# Patient Record
Sex: FEMALE | Race: WHITE | NOT HISPANIC OR LATINO | Employment: STUDENT | URBAN - METROPOLITAN AREA
[De-identification: names, ages, dates, MRNs, and addresses within clinical notes are randomized per-mention and may not be internally consistent; named-entity substitution may affect disease eponyms.]

---

## 2020-01-06 ENCOUNTER — HOSPITAL ENCOUNTER (EMERGENCY)
Facility: HOSPITAL | Age: 30
Discharge: HOME | End: 2020-01-06
Attending: FAMILY MEDICINE
Payer: COMMERCIAL

## 2020-01-06 VITALS
HEIGHT: 66 IN | HEART RATE: 75 BPM | WEIGHT: 293 LBS | SYSTOLIC BLOOD PRESSURE: 121 MMHG | BODY MASS INDEX: 47.09 KG/M2 | RESPIRATION RATE: 17 BRPM | TEMPERATURE: 99 F | OXYGEN SATURATION: 97 % | DIASTOLIC BLOOD PRESSURE: 56 MMHG

## 2020-01-06 DIAGNOSIS — A08.4 VIRAL GASTROENTERITIS: Primary | ICD-10-CM

## 2020-01-06 LAB
ALBUMIN SERPL BCP-MCNC: 3.8 G/DL (ref 3.5–5.2)
ALP SERPL-CCNC: 61 U/L (ref 55–135)
ALT SERPL W/O P-5'-P-CCNC: 79 U/L (ref 10–44)
ANION GAP SERPL CALC-SCNC: 14 MMOL/L (ref 8–16)
AST SERPL-CCNC: 53 U/L (ref 10–40)
B-HCG UR QL: NEGATIVE
BASOPHILS # BLD AUTO: 0.03 K/UL (ref 0–0.2)
BASOPHILS NFR BLD: 0.4 % (ref 0–1.9)
BILIRUB SERPL-MCNC: 0.5 MG/DL (ref 0.1–1)
BILIRUB UR QL STRIP: NEGATIVE
BUN SERPL-MCNC: 7 MG/DL (ref 6–20)
CALCIUM SERPL-MCNC: 9.4 MG/DL (ref 8.7–10.5)
CHLORIDE SERPL-SCNC: 102 MMOL/L (ref 95–110)
CLARITY UR: CLEAR
CO2 SERPL-SCNC: 24 MMOL/L (ref 23–29)
COLOR UR: YELLOW
CREAT SERPL-MCNC: 0.8 MG/DL (ref 0.5–1.4)
DIFFERENTIAL METHOD: ABNORMAL
EOSINOPHIL # BLD AUTO: 0.1 K/UL (ref 0–0.5)
EOSINOPHIL NFR BLD: 1.3 % (ref 0–8)
ERYTHROCYTE [DISTWIDTH] IN BLOOD BY AUTOMATED COUNT: 12.9 % (ref 11.5–14.5)
EST. GFR  (AFRICAN AMERICAN): >60 ML/MIN/1.73 M^2
EST. GFR  (NON AFRICAN AMERICAN): >60 ML/MIN/1.73 M^2
GLUCOSE SERPL-MCNC: 110 MG/DL (ref 70–110)
GLUCOSE UR QL STRIP: NEGATIVE
HCT VFR BLD AUTO: 41.7 % (ref 37–48.5)
HGB BLD-MCNC: 13.6 G/DL (ref 12–16)
HGB UR QL STRIP: ABNORMAL
IMM GRANULOCYTES # BLD AUTO: 0.04 K/UL (ref 0–0.04)
IMM GRANULOCYTES NFR BLD AUTO: 0.5 % (ref 0–0.5)
INFLUENZA A, MOLECULAR: NEGATIVE
INFLUENZA B, MOLECULAR: NEGATIVE
KETONES UR QL STRIP: NEGATIVE
LEUKOCYTE ESTERASE UR QL STRIP: NEGATIVE
LIPASE SERPL-CCNC: <4 U/L (ref 4–60)
LYMPHOCYTES # BLD AUTO: 2.1 K/UL (ref 1–4.8)
LYMPHOCYTES NFR BLD: 26 % (ref 18–48)
MCH RBC QN AUTO: 27.8 PG (ref 27–31)
MCHC RBC AUTO-ENTMCNC: 32.6 G/DL (ref 32–36)
MCV RBC AUTO: 85 FL (ref 82–98)
MONOCYTES # BLD AUTO: 0.4 K/UL (ref 0.3–1)
MONOCYTES NFR BLD: 4.3 % (ref 4–15)
NEUTROPHILS # BLD AUTO: 5.6 K/UL (ref 1.8–7.7)
NEUTROPHILS NFR BLD: 67.5 % (ref 38–73)
NITRITE UR QL STRIP: NEGATIVE
NRBC BLD-RTO: 0 /100 WBC
PH UR STRIP: 7 [PH] (ref 5–8)
PLATELET # BLD AUTO: 420 K/UL (ref 150–350)
PMV BLD AUTO: 9.1 FL (ref 9.2–12.9)
POTASSIUM SERPL-SCNC: 4.4 MMOL/L (ref 3.5–5.1)
PROT SERPL-MCNC: 7.5 G/DL (ref 6–8.4)
PROT UR QL STRIP: NEGATIVE
RBC # BLD AUTO: 4.89 M/UL (ref 4–5.4)
SODIUM SERPL-SCNC: 140 MMOL/L (ref 136–145)
SP GR UR STRIP: <=1.005 (ref 1–1.03)
SPECIMEN SOURCE: NORMAL
URN SPEC COLLECT METH UR: ABNORMAL
UROBILINOGEN UR STRIP-ACNC: NEGATIVE EU/DL
WBC # BLD AUTO: 8.23 K/UL (ref 3.9–12.7)

## 2020-01-06 PROCEDURE — 63600175 PHARM REV CODE 636 W HCPCS: Performed by: FAMILY MEDICINE

## 2020-01-06 PROCEDURE — 85025 COMPLETE CBC W/AUTO DIFF WBC: CPT

## 2020-01-06 PROCEDURE — 80053 COMPREHEN METABOLIC PANEL: CPT

## 2020-01-06 PROCEDURE — 81025 URINE PREGNANCY TEST: CPT

## 2020-01-06 PROCEDURE — 87502 INFLUENZA DNA AMP PROBE: CPT

## 2020-01-06 PROCEDURE — 81003 URINALYSIS AUTO W/O SCOPE: CPT

## 2020-01-06 PROCEDURE — 99284 EMERGENCY DEPT VISIT MOD MDM: CPT | Mod: 25

## 2020-01-06 PROCEDURE — 96374 THER/PROPH/DIAG INJ IV PUSH: CPT

## 2020-01-06 PROCEDURE — 83690 ASSAY OF LIPASE: CPT

## 2020-01-06 PROCEDURE — 36415 COLL VENOUS BLD VENIPUNCTURE: CPT

## 2020-01-06 RX ORDER — ONDANSETRON 2 MG/ML
4 INJECTION INTRAMUSCULAR; INTRAVENOUS
Status: COMPLETED | OUTPATIENT
Start: 2020-01-06 | End: 2020-01-06

## 2020-01-06 RX ORDER — ONDANSETRON 2 MG/ML
4 INJECTION INTRAMUSCULAR; INTRAVENOUS
Status: DISCONTINUED | OUTPATIENT
Start: 2020-01-06 | End: 2020-01-06

## 2020-01-06 RX ORDER — DULOXETIN HYDROCHLORIDE 20 MG/1
90 CAPSULE, DELAYED RELEASE ORAL DAILY
COMMUNITY

## 2020-01-06 RX ORDER — BUSPIRONE HYDROCHLORIDE 10 MG/1
10 TABLET ORAL 2 TIMES DAILY
COMMUNITY

## 2020-01-06 RX ORDER — PROMETHAZINE HYDROCHLORIDE 25 MG/1
12.5 TABLET ORAL EVERY 6 HOURS PRN
Qty: 15 TABLET | Refills: 0 | Status: SHIPPED | OUTPATIENT
Start: 2020-01-06

## 2020-01-06 RX ORDER — ZOLPIDEM TARTRATE 10 MG/1
5 TABLET ORAL NIGHTLY PRN
COMMUNITY

## 2020-01-06 RX ADMIN — ONDANSETRON 4 MG: 2 INJECTION INTRAMUSCULAR; INTRAVENOUS at 01:01

## 2020-01-06 RX ADMIN — SODIUM CHLORIDE 1000 ML: 0.9 INJECTION, SOLUTION INTRAVENOUS at 01:01

## 2020-01-06 NOTE — ED PROVIDER NOTES
SCRIBE #1 NOTE: I, Cali Guillen, am scribing for, and in the presence of, Moraima Presley MD. I have scribed the entire note.       History     Chief Complaint   Patient presents with    Vomiting     +N/V/D pt was seen at urgent care. She was give 2 L LR and zofran. She is still vomiting and unable to keep anything down. LMP 2 mos ago     Review of patient's allergies indicates:   Allergen Reactions    Dextromethorphan Other (See Comments)         History of Present Illness     HPI    1/6/2020, 1:49 AM  History obtained from the patient      History of Present Illness: Carolina Wolf is a 29 y.o. female patient with a PMHx of anxiety and depression who presents to the Emergency Department for evaluation of vomiting which onset suddenly 3 days PTA. Pt's LNMP was 2.5 months ago. Pt states she has irregular menses; pt does not take BC and has not been sexually active for over a year. Pt went to a walk-in clinic 2 days PTA, where she was given 2L lactated ringer's and an Rx for sublingual Zofran. Pt states the Zofran helped until yesterday. Symptoms are episodic and moderate in severity. No mitigating or exacerbating factors reported. Associated sxs include nausea, diarrhea, and reduced appetite. Patient denies any abdominal pain, fever, chills, constipation, hematochezia, dysuria, hematuria, urinary frequency, vaginal discharge, and all other sxs at this time. Prior Tx includes 2L lactated ringer's and Zofran with some sx improvement. No further complaints or concerns at this time.       Arrival mode: Personal vehicle    PCP: Primary Doctor No        Past Medical History:  Past Medical History:   Diagnosis Date    Anxiety     Depressed      Past Surgical History:  History reviewed. No pertinent surgical history.    Family History:  History reviewed. No pertinent family history.    Social History:  Social History Main Topics    Smoking status: Unknown if ever smoked    Smokeless tobacco: Unknown if ever used     Alcohol Use: Unknown drinking history    Drug Use: Unknown if ever used    Sexual Activity: Unknown        Review of Systems     Review of Systems   Constitutional: Positive for appetite change (Reduced). Negative for chills and fever.   HENT: Negative for sore throat.    Respiratory: Negative for cough and shortness of breath.    Cardiovascular: Negative for chest pain and leg swelling.   Gastrointestinal: Positive for diarrhea, nausea and vomiting. Negative for abdominal pain, blood in stool and constipation.   Genitourinary: Negative for dysuria, frequency, hematuria and vaginal discharge.   Musculoskeletal: Negative for back pain, neck pain and neck stiffness.   Skin: Negative for rash and wound.   Neurological: Negative for dizziness, weakness, light-headedness, numbness and headaches.   Hematological: Does not bruise/bleed easily.   All other systems reviewed and are negative.     Physical Exam     Initial Vitals [01/06/20 0052]   BP Pulse Resp Temp SpO2   134/82 93 17 100.3 °F (37.9 °C) 98 %      MAP       --          Physical Exam  Nursing Notes and Vital Signs Reviewed.  Constitutional: Patient is in no acute distress. Morbidly obese.  Head: Atraumatic. Normocephalic.  Eyes: PERRL. EOM intact. Conjunctivae are not pale. No scleral icterus.  ENT: Mucous membranes are moist. Oropharynx is clear and symmetric.    Neck: Supple. Full ROM. No lymphadenopathy.  Cardiovascular: Regular rate. Regular rhythm. No murmurs, rubs, or gallops. Distal pulses are 2+ and symmetric.  Pulmonary/Chest: No respiratory distress. Clear to auscultation bilaterally. No wheezing or rales.  Abdominal: Soft and non-distended.  There is no tenderness.  No rebound, guarding, or rigidity. Good bowel sounds.  Genitourinary: No CVA tenderness  Musculoskeletal: Moves all extremities. No obvious deformities. No edema. No calf tenderness.  Skin: Warm and dry.  Neurological:  Alert, awake, and appropriate.  Normal speech.  No acute focal  "neurological deficits are appreciated.  Psychiatric: Normal affect. Good eye contact. Appropriate in content.     ED Course   Procedures  ED Vital Signs:  Vitals:    01/06/20 0052 01/06/20 0300   BP: 134/82 (!) 121/56   Pulse: 93 75   Resp: 17    Temp: 100.3 °F (37.9 °C) 98.6 °F (37 °C)   TempSrc: Oral    SpO2: 98% 97%   Weight: (!) 149.3 kg (329 lb 4.1 oz)    Height: 5' 6" (1.676 m)        Abnormal Lab Results:  Labs Reviewed   CBC W/ AUTO DIFFERENTIAL - Abnormal; Notable for the following components:       Result Value    Platelets 420 (*)     MPV 9.1 (*)     All other components within normal limits    Narrative:     For upper or mid abdominal pain.   COMPREHENSIVE METABOLIC PANEL - Abnormal; Notable for the following components:    AST 53 (*)     ALT 79 (*)     All other components within normal limits    Narrative:     For upper or mid abdominal pain.   URINALYSIS, REFLEX TO URINE CULTURE - Abnormal; Notable for the following components:    Specific Gravity, UA <=1.005 (*)     Occult Blood UA Trace (*)     All other components within normal limits    Narrative:     In and Out Cath as needed it patient unable to void  Preferred Collection Type->Urine, Clean Catch   INFLUENZA A & B BY MOLECULAR   LIPASE    Narrative:     For upper or mid abdominal pain.   PREGNANCY TEST, URINE RAPID        All Lab Results:  Results for orders placed or performed during the hospital encounter of 01/06/20   Influenza A & B by Molecular   Result Value Ref Range    Influenza A, Molecular Negative Negative    Influenza B, Molecular Negative Negative    Flu A & B Source Nasal swab    CBC auto differential   Result Value Ref Range    WBC 8.23 3.90 - 12.70 K/uL    RBC 4.89 4.00 - 5.40 M/uL    Hemoglobin 13.6 12.0 - 16.0 g/dL    Hematocrit 41.7 37.0 - 48.5 %    Mean Corpuscular Volume 85 82 - 98 fL    Mean Corpuscular Hemoglobin 27.8 27.0 - 31.0 pg    Mean Corpuscular Hemoglobin Conc 32.6 32.0 - 36.0 g/dL    RDW 12.9 11.5 - 14.5 %    " Platelets 420 (H) 150 - 350 K/uL    MPV 9.1 (L) 9.2 - 12.9 fL    Immature Granulocytes 0.5 0.0 - 0.5 %    Gran # (ANC) 5.6 1.8 - 7.7 K/uL    Immature Grans (Abs) 0.04 0.00 - 0.04 K/uL    Lymph # 2.1 1.0 - 4.8 K/uL    Mono # 0.4 0.3 - 1.0 K/uL    Eos # 0.1 0.0 - 0.5 K/uL    Baso # 0.03 0.00 - 0.20 K/uL    nRBC 0 0 /100 WBC    Gran% 67.5 38.0 - 73.0 %    Lymph% 26.0 18.0 - 48.0 %    Mono% 4.3 4.0 - 15.0 %    Eosinophil% 1.3 0.0 - 8.0 %    Basophil% 0.4 0.0 - 1.9 %    Differential Method Automated    Comprehensive metabolic panel   Result Value Ref Range    Sodium 140 136 - 145 mmol/L    Potassium 4.4 3.5 - 5.1 mmol/L    Chloride 102 95 - 110 mmol/L    CO2 24 23 - 29 mmol/L    Glucose 110 70 - 110 mg/dL    BUN, Bld 7 6 - 20 mg/dL    Creatinine 0.8 0.5 - 1.4 mg/dL    Calcium 9.4 8.7 - 10.5 mg/dL    Total Protein 7.5 6.0 - 8.4 g/dL    Albumin 3.8 3.5 - 5.2 g/dL    Total Bilirubin 0.5 0.1 - 1.0 mg/dL    Alkaline Phosphatase 61 55 - 135 U/L    AST 53 (H) 10 - 40 U/L    ALT 79 (H) 10 - 44 U/L    Anion Gap 14 8 - 16 mmol/L    eGFR if African American >60 >60 mL/min/1.73 m^2    eGFR if non African American >60 >60 mL/min/1.73 m^2   Urinalysis, Reflex to Urine Culture Urine, Clean Catch   Result Value Ref Range    Specimen UA Urine, Clean Catch     Color, UA Yellow Yellow, Straw, [place]    Appearance, UA Clear Clear    pH, UA 7.0 5.0 - 8.0    Specific Gravity, UA <=1.005 (A) 1.005 - 1.030    Protein, UA Negative Negative    Glucose, UA Negative Negative    Ketones, UA Negative Negative    Bilirubin (UA) Negative Negative    Occult Blood UA Trace (A) Negative    Nitrite, UA Negative Negative    Urobilinogen, UA Negative <2.0 EU/dL    Leukocytes, UA Negative Negative   Lipase   Result Value Ref Range    Lipase <4 4 - 60 U/L   Pregnancy, urine rapid (UPT)   Result Value Ref Range    Preg Test, Ur Negative         Imaging Results:  Imaging Results    None              The Emergency Provider reviewed the vital signs and test  results, which are outlined above.     ED Discussion     2:56 AM: Reassessed pt at this time.  Pt states her condition has improved at this time. Discussed with pt all pertinent ED information and results. Discussed pt dx and plan of tx. Gave pt all f/u and return to the ED instructions. All questions and concerns were addressed at this time. Pt expresses understanding of information and instructions, and is comfortable with plan to discharge. Pt is stable for discharge.    I discussed with patient and/or family/caretaker that evaluation in the ED does not suggest any emergent or life threatening medical conditions requiring immediate intervention beyond what was provided in the ED, and I believe patient is safe for discharge.  Regardless, an unremarkable evaluation in the ED does not preclude the development or presence of a serious of life threatening condition. As such, patient was instructed to return immediately for any worsening or change in current symptoms.      Discussed the signs and symptoms of gastroenteritis with patient including: abdominal pain; nausea, vomiting, and diarrhea; chills; clammy skin; excessive sweating; fever; joint stiffness; fecal incontinence; muscle pain; and weight loss. Advised patient to drink water or sports beverages such as Gatorade for electrolyte replacement; do NOT use fruit juice (including apple juice), sodas or cola (flat or bubbly), Jell-O, or broth due to high sugar content; drink small amounts of fluid (2-4 oz.) every 30-60 minutes; and eat small amounts of food, when tolerable such as cereals, bread, potatoes, apples, bananas, and vegetables.  I also instructed on disease transmission and need for frequent hand washing.    Medical Decision Making:   Clinical Tests:   Lab Tests: Ordered and Reviewed           ED Medication(s):  Medications   ondansetron injection 4 mg (4 mg Intravenous Given 1/6/20 0106)   sodium chloride 0.9% bolus 1,000 mL (1,000 mLs Intravenous New  "Bag 1/6/20 0103)       Discharge Medication List as of 1/6/2020  2:54 AM      START taking these medications    Details   promethazine (PHENERGAN) 25 MG tablet Take 0.5 tablets (12.5 mg total) by mouth every 6 (six) hours as needed for Nausea., Starting Mon 1/6/2020, Print             Follow-up Information     [facility]. Schedule an appointment as soon as possible for a visit in 3 days.    Contact information:  [address] [facility] [address] [phone]                       Scribe Attestation:   Scribe #1: I performed the above scribed service and the documentation accurately describes the services I performed. I attest to the accuracy of the note.     Attending:   Physician Attestation Statement for Scribe #1: I, Moraima Presley MD, personally performed the services described in this documentation, as scribed by Cali Guillen, in my presence, and it is both accurate and complete.           Clinical Impression       ICD-10-CM ICD-9-CM   1. Viral gastroenteritis A08.4 008.8       Disposition:   Disposition: Discharged  Condition: Stable     Portions of this note may have been created with voice recognition software. Occasional "wrong-word" or "sound-a-like" substitutions may have occurred due to the inherent limitations of voice recognition software. Please, read the note carefully and recognize, using context, where substitutions have occurred.          Moraima Presley MD  01/08/20 1109    "